# Patient Record
Sex: MALE | Race: BLACK OR AFRICAN AMERICAN | NOT HISPANIC OR LATINO | Employment: FULL TIME | URBAN - METROPOLITAN AREA
[De-identification: names, ages, dates, MRNs, and addresses within clinical notes are randomized per-mention and may not be internally consistent; named-entity substitution may affect disease eponyms.]

---

## 2017-12-11 ENCOUNTER — HOSPITAL ENCOUNTER (EMERGENCY)
Facility: MEDICAL CENTER | Age: 32
End: 2017-12-11
Attending: EMERGENCY MEDICINE

## 2017-12-11 VITALS
WEIGHT: 186.29 LBS | RESPIRATION RATE: 16 BRPM | BODY MASS INDEX: 26.08 KG/M2 | TEMPERATURE: 97.8 F | SYSTOLIC BLOOD PRESSURE: 130 MMHG | DIASTOLIC BLOOD PRESSURE: 80 MMHG | OXYGEN SATURATION: 97 % | HEIGHT: 71 IN | HEART RATE: 65 BPM

## 2017-12-11 DIAGNOSIS — N34.2 URETHRITIS: ICD-10-CM

## 2017-12-11 LAB
APPEARANCE UR: CLEAR
BILIRUB UR QL STRIP.AUTO: NEGATIVE
COLOR UR: YELLOW
GLUCOSE UR STRIP.AUTO-MCNC: NEGATIVE MG/DL
KETONES UR STRIP.AUTO-MCNC: NEGATIVE MG/DL
LEUKOCYTE ESTERASE UR QL STRIP.AUTO: NEGATIVE
MICRO URNS: ABNORMAL
NITRITE UR QL STRIP.AUTO: NEGATIVE
PH UR STRIP.AUTO: 8.5 [PH]
PROT UR QL STRIP: NEGATIVE MG/DL
RBC UR QL AUTO: NEGATIVE
SP GR UR STRIP.AUTO: 1.01
UROBILINOGEN UR STRIP.AUTO-MCNC: 0.2 MG/DL

## 2017-12-11 PROCEDURE — 700102 HCHG RX REV CODE 250 W/ 637 OVERRIDE(OP): Performed by: EMERGENCY MEDICINE

## 2017-12-11 PROCEDURE — 87591 N.GONORRHOEAE DNA AMP PROB: CPT

## 2017-12-11 PROCEDURE — 87491 CHLMYD TRACH DNA AMP PROBE: CPT

## 2017-12-11 PROCEDURE — 96372 THER/PROPH/DIAG INJ SC/IM: CPT

## 2017-12-11 PROCEDURE — 81003 URINALYSIS AUTO W/O SCOPE: CPT

## 2017-12-11 PROCEDURE — 700101 HCHG RX REV CODE 250: Performed by: EMERGENCY MEDICINE

## 2017-12-11 PROCEDURE — A9270 NON-COVERED ITEM OR SERVICE: HCPCS | Performed by: EMERGENCY MEDICINE

## 2017-12-11 PROCEDURE — 700111 HCHG RX REV CODE 636 W/ 250 OVERRIDE (IP): Performed by: EMERGENCY MEDICINE

## 2017-12-11 PROCEDURE — 99284 EMERGENCY DEPT VISIT MOD MDM: CPT

## 2017-12-11 RX ORDER — LIDOCAINE HYDROCHLORIDE 20 MG/ML
20 INJECTION, SOLUTION INFILTRATION; PERINEURAL ONCE
Status: COMPLETED | OUTPATIENT
Start: 2017-12-11 | End: 2017-12-11

## 2017-12-11 RX ORDER — CEFTRIAXONE SODIUM 250 MG/1
250 INJECTION, POWDER, FOR SOLUTION INTRAMUSCULAR; INTRAVENOUS ONCE
Status: COMPLETED | OUTPATIENT
Start: 2017-12-11 | End: 2017-12-11

## 2017-12-11 RX ORDER — AZITHROMYCIN 250 MG/1
1000 TABLET, FILM COATED ORAL ONCE
Status: COMPLETED | OUTPATIENT
Start: 2017-12-11 | End: 2017-12-11

## 2017-12-11 RX ADMIN — AZITHROMYCIN 1000 MG: 250 TABLET, FILM COATED ORAL at 10:58

## 2017-12-11 RX ADMIN — CEFTRIAXONE SODIUM 250 MG: 250 INJECTION, POWDER, FOR SOLUTION INTRAMUSCULAR; INTRAVENOUS at 11:17

## 2017-12-11 RX ADMIN — LIDOCAINE HYDROCHLORIDE 1.2 ML: 20 INJECTION, SOLUTION INFILTRATION; PERINEURAL at 11:17

## 2017-12-11 ASSESSMENT — PAIN SCALES - GENERAL: PAINLEVEL_OUTOF10: 0

## 2017-12-11 NOTE — ED NOTES
Reviewed all discharge instructions with patient, discussed f/u with PCP, pt provided [facility] phone number, Pt denies questions. Pt escorted to lobby.

## 2017-12-11 NOTE — DISCHARGE INSTRUCTIONS
Urethritis, Adult  Urethritis is an inflammation of the tube through which urine exits your bladder (urethra).   CAUSES  Urethritis is often caused by an infection in your urethra. The infection can be viral, like herpes. The infection can also be bacterial, like gonorrhea.  RISK FACTORS  Risk factors of urethritis include:  · Having sex without using a condom.  · Having multiple sexual partners.  · Having poor hygiene.  SIGNS AND SYMPTOMS  Symptoms of urethritis are less noticeable in women than in men. These symptoms include:  · Burning feeling when you urinate (dysuria).  · Discharge from your urethra.  · Blood in your urine (hematuria).  · Urinating more than usual.  DIAGNOSIS   To confirm a diagnosis of urethritis, your health care provider will do the following:  · Ask about your sexual history.  · Perform a physical exam.  · Have you provide a sample of your urine for lab testing.  · Use a cotton swab to gently collect a sample from your urethra for lab testing.  TREATMENT   It is important to treat urethritis. Depending on the cause, untreated urethritis may lead to serious genital infections and possibly infertility. Urethritis caused by a bacterial infection is treated with antibiotic medicine. All sexual partners must be treated.   HOME CARE INSTRUCTIONS  · Do not have sex until the test results are known and treatment is completed, even if your symptoms go away before you finish treatment.  · If you were prescribed an antibiotic, finish it all even if you start to feel better.  SEEK MEDICAL CARE IF:   · Your symptoms are not improved in 3 days.  · Your symptoms are getting worse.  · You develop abdominal pain or pelvic pain (in women).  · You develop joint pain.  · You have a fever.  SEEK IMMEDIATE MEDICAL CARE IF:   · You have severe pain in the belly, back, or side.  · You have repeated vomiting.  MAKE SURE YOU:  · Understand these instructions.  · Will watch your condition.  · Will get help right away  if you are not doing well or get worse.     This information is not intended to replace advice given to you by your health care provider. Make sure you discuss any questions you have with your health care provider.     Document Released: 06/13/2002 Document Revised: 05/03/2016 Document Reviewed: 08/18/2014  Elsevier Interactive Patient Education ©2016 [employer]

## 2017-12-11 NOTE — ED NOTES
"Pt ambulates to triage with c/c bloody stools -\"forever- I fill the toilet with blood when I have a bowel movement.\"  Pt also requesting STD check.  A&ox4. Pt to lobby & advised to inform RN of any changes.    "

## 2017-12-11 NOTE — ED NOTES
"Pt ambulatory to [facility]. Reluctant to change into gown, states \" I know what I need, I need antibiotics for my STD\". Process explained to pt.  Awaiting ERP eval.  "

## 2017-12-11 NOTE — ED PROVIDER NOTES
"ED Provider Note    CHIEF COMPLAINT  Chief Complaint   Patient presents with   • Exposure to STD     requesting STD check   • Bloody Stools     \"the water in the toilet turns red when I have a bowel movement.\"        HPI  Ganga Castro is a 32 y.o. male who presents For evaluation of reported dysuria or urethral discharge. The patient reports a new sexual partner without protection. Over the past week he reports urethral discharge. An incidental and unrelated complaint is that he's had occasional blood intermixed in his stool. He reports that this is been going on for years. He denies any high fevers or chills currently no abdominal pain. No genital lesions no testicular pain or swelling. He has no other complaints at this time    REVIEW OF SYSTEMS  See HPI for further details. No high fevers chills night sweats weight loss numbness tingling or weakness All other systems are negative.     PAST MEDICAL HISTORY  Past Medical History:   Diagnosis Date   • Gastric ulcer        FAMILY HISTORY  No history of bleeding disorder    SOCIAL HISTORY  Social History     Social History   • Marital status: Single     Spouse name: N/A   • Number of children: N/A   • Years of education: N/A     Social History Main Topics   • Smoking status: Not on file   • Smokeless tobacco: Not on file   • Alcohol use Not on file   • Drug use: Unknown   • Sexual activity: Not on file     Other Topics Concern   • Not on file     Social History Narrative   • No narrative on file   Sexually active denies IV drugs     SURGICAL HISTORY  No past surgical history on file.  None reported  CURRENT MEDICATIONS  No regular meds    ALLERGIES  No Known Allergies    PHYSICAL EXAM  VITAL SIGNS: /86   Pulse 79   Temp 36.6 °C (97.8 °F)   Resp 14   Ht 1.803 m (5' 11\")   Wt 84.5 kg (186 lb 4.6 oz)   SpO2 97%   BMI 25.98 kg/m²  Room air O2: 97    Constitutional: Well developed, Well nourished, No acute distress, Non-toxic appearance.   HENT: " Normocephalic, Atraumatic, Bilateral external ears normal, Oropharynx moist, No oral exudates, Nose normal.   Eyes: PERRLA, EOMI, Conjunctiva normal, No discharge.   Neck: Normal range of motion, No tenderness, Supple, No stridor.   Cardiovascular: Normal heart rate, Normal rhythm, No murmurs, No rubs, No gallops.   Thorax & Lungs: Normal breath sounds, No respiratory distress, No wheezing, No chest tenderness.   Abdomen: Bowel sounds normal, Soft, No tenderness, No masses, No pulsatile masses.   Skin: Warm, Dry, No erythema, No rash.   Back: No tenderness, No CVA tenderness.   Genitalia: External genitalia appear normal there does appear to be some subtle urethral discharge no genital lesions no scrotal masses  Extremities: Intact distal pulses, No edema, No tenderness, No cyanosis, No clubbing.    Neurologic: Alert & oriented x 3, Normal motor function, Normal sensory function, No focal deficits noted.   Psychiatric: Patient is very anxious   Results for orders placed or performed during the hospital encounter of 12/11/17   URINALYSIS   Result Value Ref Range    Color Yellow     Character Clear     Specific Gravity 1.014 <1.035    Ph 8.5 (A) 5.0 - 8.0    Glucose Negative Negative mg/dL    Ketones Negative Negative mg/dL    Protein Negative Negative mg/dL    Bilirubin Negative Negative    Urobilinogen, Urine 0.2 Negative    Nitrite Negative Negative    Leukocyte Esterase Negative Negative    Occult Blood Negative Negative    Micro Urine Req see below    CHLAMYDIA/GC PCR URINE OR SWAB   Result Value Ref Range    Source urine       COURSE & MEDICAL DECISION MAKING  Pertinent Labs & Imaging studies reviewed. (See chart for details)  Patient has declined any further workup such as rectal examination blood testing. His vital signs and belly exam are ormal. He simply wants treatment and testing for STDs. He did not understand why an intramuscular shot was required. I explained to him the  recommendations on treatment  of infected urethritis. He did provide a urine sample as he would not comply with a male genital swab test. He was given intramuscular Rocephin and 1000 mg of azithromycin. I counseled him on safe sex practices    FINAL IMPRESSION  1. Infected urethritis      Electronically signed by: Roland Magdaleno, 12/11/2017 10:22 AM

## 2017-12-12 LAB
C TRACH DNA SPEC QL NAA+PROBE: NEGATIVE
N GONORRHOEA DNA SPEC QL NAA+PROBE: NEGATIVE
SPECIMEN SOURCE: NORMAL

## 2018-04-24 ENCOUNTER — HOSPITAL ENCOUNTER (EMERGENCY)
Facility: MEDICAL CENTER | Age: 33
End: 2018-04-24
Attending: EMERGENCY MEDICINE
Payer: COMMERCIAL

## 2018-04-24 VITALS
BODY MASS INDEX: 26.7 KG/M2 | HEART RATE: 75 BPM | TEMPERATURE: 97.6 F | OXYGEN SATURATION: 99 % | WEIGHT: 190.7 LBS | HEIGHT: 71 IN | RESPIRATION RATE: 18 BRPM | SYSTOLIC BLOOD PRESSURE: 135 MMHG | DIASTOLIC BLOOD PRESSURE: 82 MMHG

## 2018-04-24 DIAGNOSIS — S29.012A UPPER BACK STRAIN, INITIAL ENCOUNTER: ICD-10-CM

## 2018-04-24 PROCEDURE — 99282 EMERGENCY DEPT VISIT SF MDM: CPT

## 2018-04-24 ASSESSMENT — PAIN SCALES - GENERAL
PAINLEVEL_OUTOF10: 7
PAINLEVEL_OUTOF10: 7

## 2018-04-24 ASSESSMENT — LIFESTYLE VARIABLES: DO YOU DRINK ALCOHOL: NO

## 2018-04-24 NOTE — ED NOTES
Verbalizes understanding of discharge and followup instructions. PIV removed, VSS. Given worknote. Ambulates with steady gait to discharge.

## 2018-04-24 NOTE — ED TRIAGE NOTES
"Ganga Castro  Chief Complaint   Patient presents with   • Back Pain     L side. Pt states pain started at work yesterday and pain has increased in severity throughout the night. Pt states pain made sleep difficult.       Pt ambulatory to triage with above complaint. Pt unsure if a movement at work caused pain, put states he is unaware of doing anything specific to cause the pain.    /82   Pulse 75   Temp 36.4 °C (97.6 °F)   Resp 18   Ht 1.803 m (5' 11\")   Wt 86.5 kg (190 lb 11.2 oz)   SpO2 99%   BMI 26.60 kg/m²     Pt informed of triage process and encouraged to notify staff of any changes or concerns. Pt verbalized understanding of instructions. Apologized for long wait time. Pt placed back in lobby.     "

## 2018-04-24 NOTE — ED PROVIDER NOTES
"ED Provider Note      CHIEF COMPLAINT  Chief Complaint   Patient presents with   • Back Pain     L side. Pt states pain started at work yesterday and pain has increased in severity throughout the night. Pt states pain made sleep difficult.       JUDD Castro is a 32 y.o. male who presents with left-sided posterior thoracic pain. Started while at work yesterday. He was using a weed whacker with his right arm straightened his left arm bent. Healing slightly to the right when he does this. He noticed some pain just underneath his left scapula. It is sharp and worse when he bends to the right or the twists. No pleuritic pain, cough, hemoptysis, leg swelling, leg pain, surgery, immobilization. He has not had dysuria hematuria or frequency.        REVIEW OF SYSTEMS  Denies any weakness in the lower extremities. No bowel or bladder incontinence or saddle anesthesia. No fevers or chills. No injection drug use or IV drug abuse. No abdominal pain, nausea or vomiting. No dysuria, hematuria or flank pain.    PAST MEDICAL HISTORY  Past Medical History:   Diagnosis Date   • Gastric ulcer        FAMILY HISTORY  History reviewed. No pertinent family history.    SOCIAL HISTORY  Social History   Substance Use Topics   • Smoking status: Current Every Day Smoker   • Smokeless tobacco: Never Used   • Alcohol use No       SURGICAL HISTORY  History reviewed. No pertinent surgical history.    CURRENT MEDICATIONS  Home Medications     Reviewed by Eh Johnston R.N. (Registered Nurse) on 04/24/18 at 0709  Med List Status: Complete   Medication Last Dose Status        Patient Speedy Taking any Medications                       ALLERGIES  No Known Allergies      PHYSICAL EXAM  VITAL SIGNS: /82   Pulse 75   Temp 36.4 °C (97.6 °F)   Resp 18   Ht 1.803 m (5' 11\")   Wt 86.5 kg (190 lb 11.2 oz)   SpO2 99%   BMI 26.60 kg/m²   Constitutional: No acute distress  Neck: Grossly normal range of motion  Cardiovascular: Normal heart " rate   Thorax & Lungs: No respiratory distress  Abdomen: Bowel sounds normal, soft, non-distended, nontender, no masses.  Skin: Warm, Dry, No rash.   Back: There is mild tenderness to palpation just below the left scapula. Movement to the right reproduces symptoms  Extremities: No clubbing, cyanosis, edema, no Homans or cords   Neurologic: Symmetric motor and sensory throughout.    COURSE & MEDICAL DECISION MAKING    Patient presents with history and physical consistent with a thoracic muscular strain. No red flags. I've advised NSAIDs as needed. Advised rest. No lifting for one week. He requested to go back to work tomorrow. I advised him that if he continues with same activities he will have persistent discomfort. He voiced understanding. Will return for fever, urinary symptoms, difficulty breathing or concern. Refer to the [facility] occupational health clinic    Will need to follow up with primary provider for blood pressure recheck. Given number for [facility].    FINAL IMPRESSION  1. Thoracic muscular strain        This dictation was created using voice recognition software. The accuracy of the dictation is limited to the abilities of the software.  The nursing notes were reviewed and certain aspects of this information were incorporated into this note.    Electronically signed by: Ash Burgos, 4/24/2018 7:33 AM

## 2018-04-24 NOTE — ED TRIAGE NOTES
Ambulatory to room, agree with triage note. Right mid back pain, worse with movement. Denies specific injury or strain. Chart up for ERP.

## 2018-04-24 NOTE — DISCHARGE INSTRUCTIONS
Mid-Back Strain  A strain is a stretch or tear in a muscle or the strong cords of tissue that attach muscle to bone (tendons). Strains of the mid-back (thoracic spine) occur between the neck and the lower back. A strain occurs when muscles or tendons are torn or are stretched beyond their limits. The muscles may become inflamed, resulting in pain and sudden muscle tightening (spasms). A strain can happen suddenly due to an injury (trauma), or it can develop gradually due to overuse.  There are three types of strains:  · Grade 1 is a mild strain involving a minor tear of the muscle fibers or tendons. This may cause some pain but no loss of muscle strength.  · Grade 2 is a moderate strain involving a partial tear of the muscle fibers or tendons. This causes more severe pain and some loss of muscle strength.  · Grade 3 is a severe strain involving a complete tear of the muscle or tendon. This causes severe pain and complete or nearly complete loss of muscle strength.  What are the causes?  This condition may be caused by:  · Trauma, such as a fall or a hit to the body.  · Twisting or overstretching the back. This may result from doing activities that require a lot of energy, such as lifting heavy objects.  What increases the risk?  The following factors may increase your risk of getting this condition:  · Playing contact sports.  · Playing sports that put stress on the middle back, including:  ¨ Gymnastics.  ¨ Rowing.  ¨ Golf.  ¨ Horseback riding.  What are the signs or symptoms?  Symptoms of this condition may include:  · Sharp, dull, or spreading pain in the middle back that does not go away.  · Stiffness.  · Limited range of motion.  · Inability to stand up straight due to stiffness or pain.  · Muscle spasms.  How is this diagnosed?     This condition may be diagnosed based on:  · Your symptoms.  · Your medical history.  · A physical exam.  ¨ Your health care provider may push on certain areas of your back to  determine the source of your pain.  ¨ You may be asked to bend forward, backward, and side to side to assess the severity of your pain and your range of motion.  · Imaging tests, such as:  ¨ X-rays.  ¨ MRI.  How is this treated?  Treatment for this condition may include:  · Applying heat and cold to the affected area.  · Medicines to help relieve pain and to relax your muscles (muscle relaxants).  · NSAIDs to help reduce swelling and discomfort.  · Physical therapy.  When your symptoms improve, it is important to gradually return to your normal routine as soon as possible to reduce pain, avoid stiffness, and avoid loss of muscle strength. Generally, symptoms should improve within 6 weeks of treatment. However, recovery time varies.  Follow these instructions at home:  Managing pain, stiffness, and swelling  · If directed, apply ice to the injured area during the first 24 hours after your injury.  ¨ Put ice in a plastic bag.  ¨ Place a towel between your skin and the bag.  ¨ Leave the ice on for 20 minutes, 2-3 times a day.  · If directed, apply heat to the affected area as often as told by your health care provider. Use the heat source that your health care provider recommends, such as a moist heat pack or a heating pad.  ¨ Place a towel between your skin and the heat source.  ¨ Leave the heat on for 20-30 minutes.  ¨ Remove the heat if your skin turns bright red. This is especially important if you are unable to feel pain, heat, or cold. You may have a greater risk of getting burned.  Activity  · Rest and return to your normal activities as told by your health care provider. Ask your health care provider what activities are safe for you.  · Avoid activities that take a lot of effort (are strenuous) for as long as told by your health care provider.  · Do exercises as told by your health care provider.  General instructions  · Take over-the-counter and prescription medicines only as told by your health care  provider.  · If you have questions or concerns about safety while taking pain medicine, talk with your health care provider.  · Do not drive or operate heavy machinery until you know how your pain medicine affects you.  · Do not use any tobacco products, such as cigarettes, chewing tobacco, and e-cigarettes. Tobacco can delay bone healing. If you need help quitting, ask your health care provider.  · Keep all follow-up visits as told by your health care provider. This is important.  How is this prevented?  · Warm up and stretch before being active.  · Cool down and stretch after being active.  · Give your body time to rest between periods of activity.  · Avoid:  ¨ Being physically inactive for long periods at a time.  ¨ Exercising or playing sports when you are tired or in pain.  · Use correct form when playing sports and lifting heavy objects.  · Use good posture when sitting and standing.  · Maintain a healthy weight.  · Sleep on a mattress with medium firmness to support your back.  · Make sure to use equipment that fits you, including shoes that fit well.  · Be safe and responsible while being active to avoid falls.  · Do at least 150 minutes of moderate-intensity exercise each week, such as brisk walking or water aerobics. Try a form of exercise that takes stress off your back, such as swimming or stationary cycling.  · Maintain physical fitness, including:  ¨ Strength.  ¨ Flexibility.  ¨ Cardiovascular fitness.  ¨ Endurance.  Contact a health care provider if:  · Your back pain does not improve after 6 weeks of treatment.  · Your symptoms get worse.  Get help right away if:  · Your back pain is severe.  · You are unable to stand or walk.  · You develop pain in your legs.  · You develop weakness in your buttocks or legs.  · You have difficulty controlling when you urinate or when you have a bowel movement.  This information is not intended to replace advice given to you by your health care provider. Make sure  you discuss any questions you have with your health care provider.  Document Released: 12/18/2006 Document Revised: 08/24/2017 Document Reviewed: 09/28/2016  Elsevier Interactive Patient Education © 2017 [employer]

## 2019-12-05 ENCOUNTER — HOSPITAL ENCOUNTER (EMERGENCY)
Dept: HOSPITAL 8 - ED | Age: 34
Discharge: HOME | End: 2019-12-05
Payer: SELF-PAY

## 2019-12-05 VITALS — WEIGHT: 196.21 LBS | HEIGHT: 71 IN | BODY MASS INDEX: 27.47 KG/M2

## 2019-12-05 VITALS — SYSTOLIC BLOOD PRESSURE: 139 MMHG | DIASTOLIC BLOOD PRESSURE: 76 MMHG

## 2019-12-05 DIAGNOSIS — N34.1: Primary | ICD-10-CM

## 2019-12-05 LAB
CULTURE INDICATED?: YES
MICROSCOPIC: (no result)

## 2019-12-05 PROCEDURE — 87591 N.GONORRHOEAE DNA AMP PROB: CPT

## 2019-12-05 PROCEDURE — 96372 THER/PROPH/DIAG INJ SC/IM: CPT

## 2019-12-05 PROCEDURE — 87491 CHLMYD TRACH DNA AMP PROBE: CPT

## 2019-12-05 PROCEDURE — 87086 URINE CULTURE/COLONY COUNT: CPT

## 2019-12-05 PROCEDURE — 99283 EMERGENCY DEPT VISIT LOW MDM: CPT

## 2019-12-05 PROCEDURE — 87077 CULTURE AEROBIC IDENTIFY: CPT

## 2019-12-05 PROCEDURE — 81001 URINALYSIS AUTO W/SCOPE: CPT

## 2019-12-05 NOTE — NUR
Triage RN: pt denies any dizziness & states that he did not want to tell 
Registration what his actual cheif complaint of STI check is.

## 2019-12-05 NOTE — NUR
PT REPORTS PAINFUL URINATION SINCE YESTERDAY. STATES HE HAS STD. + DRAINAGE 
FROM [place]. REPORTS RECENT UNPROTECTED SEX. SITTING ON JANINE MONTANO AT 
BEDSIDE TO ASSESS PT NOW.

## 2020-02-07 ENCOUNTER — HOSPITAL ENCOUNTER (EMERGENCY)
Facility: MEDICAL CENTER | Age: 35
End: 2020-02-07
Attending: EMERGENCY MEDICINE

## 2020-02-07 VITALS
RESPIRATION RATE: 16 BRPM | SYSTOLIC BLOOD PRESSURE: 136 MMHG | BODY MASS INDEX: 27.78 KG/M2 | DIASTOLIC BLOOD PRESSURE: 85 MMHG | OXYGEN SATURATION: 96 % | TEMPERATURE: 98.1 F | HEART RATE: 62 BPM | WEIGHT: 198.41 LBS | HEIGHT: 71 IN

## 2020-02-07 DIAGNOSIS — R30.0 DYSURIA: ICD-10-CM

## 2020-02-07 LAB
APPEARANCE UR: CLEAR
BILIRUB UR QL STRIP.AUTO: NEGATIVE
C TRACH DNA SPEC QL NAA+PROBE: NEGATIVE
COLOR UR: YELLOW
GLUCOSE UR STRIP.AUTO-MCNC: NEGATIVE MG/DL
KETONES UR STRIP.AUTO-MCNC: NEGATIVE MG/DL
LEUKOCYTE ESTERASE UR QL STRIP.AUTO: NEGATIVE
MICRO URNS: NORMAL
N GONORRHOEA DNA SPEC QL NAA+PROBE: NEGATIVE
NITRITE UR QL STRIP.AUTO: NEGATIVE
PH UR STRIP.AUTO: 7 [PH] (ref 5–8)
PROT UR QL STRIP: NEGATIVE MG/DL
RBC UR QL AUTO: NEGATIVE
SP GR UR STRIP.AUTO: 1.02
SPECIMEN SOURCE: NORMAL
UROBILINOGEN UR STRIP.AUTO-MCNC: 0.2 MG/DL

## 2020-02-07 PROCEDURE — 99284 EMERGENCY DEPT VISIT MOD MDM: CPT

## 2020-02-07 PROCEDURE — A9270 NON-COVERED ITEM OR SERVICE: HCPCS | Performed by: EMERGENCY MEDICINE

## 2020-02-07 PROCEDURE — 87491 CHLMYD TRACH DNA AMP PROBE: CPT

## 2020-02-07 PROCEDURE — 87591 N.GONORRHOEAE DNA AMP PROB: CPT

## 2020-02-07 PROCEDURE — 700102 HCHG RX REV CODE 250 W/ 637 OVERRIDE(OP): Performed by: EMERGENCY MEDICINE

## 2020-02-07 PROCEDURE — 81003 URINALYSIS AUTO W/O SCOPE: CPT

## 2020-02-07 RX ORDER — PHENAZOPYRIDINE HYDROCHLORIDE 200 MG/1
200 TABLET, FILM COATED ORAL 3 TIMES DAILY PRN
Qty: 6 TAB | Refills: 0 | Status: SHIPPED | OUTPATIENT
Start: 2020-02-07 | End: 2020-02-09

## 2020-02-07 RX ORDER — PHENAZOPYRIDINE HYDROCHLORIDE 200 MG/1
200 TABLET, FILM COATED ORAL ONCE
Status: COMPLETED | OUTPATIENT
Start: 2020-02-07 | End: 2020-02-07

## 2020-02-07 RX ADMIN — PHENAZOPYRIDINE 200 MG: 200 TABLET ORAL at 11:34

## 2020-02-07 NOTE — ED NOTES
Patient discharged. Patient provided education on dysuria and to follow-up with the lab for results. Patient educated to come back to the hospital for worsening symptoms. Patient provided script and walked to lobby

## 2020-02-07 NOTE — ED PROVIDER NOTES
ED Provider Note    Scribed for Ken Spencer D.O. by Renee White. 2020  10:05 AM    Primary care provider: Pcp Pt States None  Means of arrival: Walk in  History obtained from: Patient  History limited by: None    CHIEF COMPLAINT  Chief Complaint   Patient presents with   • Painful Urination     started yesterday, pt had some RUQ pain yesterday none today.; pt reports that he was tested for STD's and was negative, no treatment was given at that time.         JUDD Castro is a 34 y.o. male, cir who presents to the Emergency Department for ongoing dysuria that began yesterday night. Patient describes that he feels a burning and sharp quality pain during urination with radiating pain to his right upper quadrant yesterday that has now resolved. He denies similar complaints in the past, and denies history of UTI or diabetes. Patient states that he is sexually active with one female partner. He and his partner were tested negative for STDs 3 months ago. He reports associated urinary frequency. He denies any associated testicular discharge, testicular pain, vaginal discharge in his partner, nausea, vomiting, or flank pain. He notes that his dysuria is worse in the morning.    REVIEW OF SYSTEMS  Pertinent positives include dysuria, urinary frequency. Pertinent negatives include no testicular discharge, testicular pain, nausea, vomiting, back pain, right upper quadrant abdominal pain (resolved).      PAST MEDICAL HISTORY  Past Medical History:   Diagnosis Date   • Gastric ulcer        SURGICAL HISTORY  History reviewed. No pertinent surgical history.     SOCIAL HISTORY  Social History     Tobacco Use   • Smoking status: Former Smoker     Packs/day: 0.00     Last attempt to quit: 2020     Years since quittin.0   • Smokeless tobacco: Never Used   Substance Use Topics   • Alcohol use: No   • Drug use: Yes     [facility]     [facility]      Social History     Substance and Sexual Activity  "  Drug Use Yes   • Types: Inhaled    Comment: [facility]lette       FAMILY HISTORY  History reviewed. No pertinent family history.    CURRENT MEDICATIONS  Home Medications     Reviewed by Ela Nayak R.N. (Registered Nurse) on 02/07/20 at 0931  Med List Status: Complete   Medication Last Dose Status        Patient Speedy Taking any Medications                       ALLERGIES  No Known Allergies    PHYSICAL EXAM  VITAL SIGNS: /80   Pulse 68   Temp 36.7 °C (98.1 °F) (Oral)   Resp 14   Ht 1.803 m (5' 11\")   Wt 90 kg (198 lb 6.6 oz)   SpO2 98%   BMI 27.67 kg/m²     Nursing notes and vitals reviewed.  Constitutional: Well developed, Well nourished, No acute distress, Non-toxic appearance.   Eyes: PERRLA, EOMI, Conjunctiva normal, No discharge.     Abdomen: Bowel sounds normal, Soft, No tenderness, No guarding, No rebound, No masses, No pulsatile masses.   Skin: Warm, Dry, No erythema, No rash.   : Circumcised male, no penile drip, no penile erythema or edema, no scrotal edema, no testicular tenderness.      DIAGNOSTIC STUDIES/PROCEDURES    LABS  Results for orders placed or performed during the hospital encounter of 02/07/20   URINALYSIS,CULTURE IF INDICATED   Result Value Ref Range    Color Yellow     Character Clear     Specific Gravity 1.021 <1.035    Ph 7.0 5.0 - 8.0    Glucose Negative Negative mg/dL    Ketones Negative Negative mg/dL    Protein Negative Negative mg/dL    Bilirubin Negative Negative    Urobilinogen, Urine 0.2 Negative    Nitrite Negative Negative    Leukocyte Esterase Negative Negative    Occult Blood Negative Negative    Micro Urine Req see below    Chlamydia/GC PCR Urine Or Swab   Result Value Ref Range    Source Urine      All labs reviewed by me.    COURSE & MEDICAL DECISION MAKING  Pertinent Labs & Imaging studies reviewed. (See chart for details)    10:05 AM - Patient seen and examined at bedside. Discussed plan of care with patient. I informed them that labs will be ordered to " evaluate symptoms. Patient is understanding and agreeable with plan. Ordered UA, Chlamydia/GC to evaluate his symptoms.     11:20 AM - Patient was reevaluated at bedside. He is resting comfortably in bed with stable vital signs and no new complaints at this time. Discussed lab results with the patient and informed them that his UA was negative. He is safe to be discharged home at this time, and will follow up with his PCP. Patient understands and agrees with discharge home.   Vitals:    02/07/20 0920   BP: 138/80   Pulse: 68   Resp: 14   Temp: 36.7 °C (98.1 °F)   SpO2: 98%       The patient will return for new or worsening symptoms and is stable at the time of discharge.    This is a charming 34 y.o. male that presents with dysuria, burning sensation.  Urinalysis is negative for infection.  Have high suspicion for gonorrhea chlamydia.  The patient adamantly refused me giving him antibiotics for presumed infection.  For this reason, I have given him Pyridium for his discomfort and stated that he will need to follow-up with his lab results as his gonorrhea chlamydia are pending at this point in time.  If he is positive, is instructed to follow-up with public health for syphilis and HIV testing and to contact his partner.  Patient understands the need to follow-up with his labs.        DISPOSITION:  Patient will be discharged home in stable condition.    The patient is referred to a primary physician for blood pressure management, diabetic screening, and for all other preventative health concerns.    FOLLOW UP:  [facility] Emergency Dept  [address] [facility] [address] [phone]    If symptoms worsen      OUTPATIENT MEDICATIONS:  New Prescriptions    PHENAZOPYRIDINE (PYRIDIUM) 200 MG TAB    Take 1 Tab by mouth 3 times a day as needed (with food) for up to 2 days.         FINAL IMPRESSION  1. Dysuria          I, Renee White (Scribe), am scribing for, and in the presence of, Ken  JOHN Spencer D.O    Electronically signed by: Renee White (Scribe), 2/7/2020    I, Ken Spencer D.O. personally performed the services described in this documentation, as scribed by Renee White in my presence, and it is both accurate and complete.    C    The note accurately reflects work and decisions made by me.  Ken Spencer D.O.  2/7/2020  2:36 PM

## 2020-02-07 NOTE — ED TRIAGE NOTES
Pt ambulated to triage with   Chief Complaint   Patient presents with   • Painful Urination     started yesterday, pt had some RUQ pain yesterday none today.; pt reports that he was tested for STD's and was negative, no treatment was given at that time.          Pt Informed regarding triage process and verbalized understanding to inform triage tech or RN for any changes in condition. Placed in lobby.

## 2020-02-07 NOTE — DISCHARGE INSTRUCTIONS
Please call the lab in 2 days for a results from your urine gonorrhea and chlamydia test.  If positive, we will be prescribing you medications and you will need to contact your partner concerning your infection.  In addition you will need to follow-up with [facility] health for syphilis and HIV testing.  If negative, you will not hear from us but is important that you check the results on your own.

## 2021-06-24 ENCOUNTER — OFFICE VISIT (OUTPATIENT)
Dept: URGENT CARE | Facility: PHYSICIAN GROUP | Age: 36
End: 2021-06-24

## 2021-06-24 ENCOUNTER — NON-PROVIDER VISIT (OUTPATIENT)
Dept: URGENT CARE | Facility: PHYSICIAN GROUP | Age: 36
End: 2021-06-24

## 2021-06-24 VITALS
TEMPERATURE: 97.8 F | OXYGEN SATURATION: 99 % | SYSTOLIC BLOOD PRESSURE: 122 MMHG | WEIGHT: 212 LBS | DIASTOLIC BLOOD PRESSURE: 82 MMHG | RESPIRATION RATE: 18 BRPM | BODY MASS INDEX: 29.68 KG/M2 | HEART RATE: 90 BPM | HEIGHT: 71 IN

## 2021-06-24 DIAGNOSIS — Z02.4 ENCOUNTER FOR DEPARTMENT OF TRANSPORTATION (DOT) EXAMINATION FOR TRUCKING LICENSE: ICD-10-CM

## 2021-06-24 PROCEDURE — 8907 PR URINE COLLECT ONLY: Performed by: NURSE PRACTITIONER

## 2021-06-24 PROCEDURE — 7100 PR DOT PHYSICAL: Performed by: NURSE PRACTITIONER

## 2021-06-24 NOTE — PROGRESS NOTES
"Subjective:      Ganga Castro is a 35 y.o. male who presents with Other (dot physical)            HPI    ROS    DOT physical   Objective:     /82 (BP Location: Left arm, Patient Position: Sitting, BP Cuff Size: Adult)   Pulse 90   Temp 36.6 °C (97.8 °F) (Temporal)   Resp 18   Ht 1.803 m (5' 11\")   Wt 96.2 kg (212 lb)   SpO2 99%   BMI 29.57 kg/m²      Physical Exam              DOT- see scanned documents     Assessment/Plan:        1. 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  .  "

## 2022-09-29 ENCOUNTER — HOSPITAL ENCOUNTER (EMERGENCY)
Facility: MEDICAL CENTER | Age: 37
End: 2022-09-30
Attending: EMERGENCY MEDICINE
Payer: COMMERCIAL

## 2022-09-29 DIAGNOSIS — M62.838 MUSCLE SPASMS OF NECK: ICD-10-CM

## 2022-09-29 PROCEDURE — 93005 ELECTROCARDIOGRAM TRACING: CPT | Performed by: EMERGENCY MEDICINE

## 2022-09-29 PROCEDURE — 93005 ELECTROCARDIOGRAM TRACING: CPT

## 2022-09-29 PROCEDURE — 99283 EMERGENCY DEPT VISIT LOW MDM: CPT

## 2022-09-30 VITALS
HEIGHT: 71 IN | SYSTOLIC BLOOD PRESSURE: 149 MMHG | TEMPERATURE: 97.7 F | DIASTOLIC BLOOD PRESSURE: 101 MMHG | WEIGHT: 187.39 LBS | BODY MASS INDEX: 26.23 KG/M2 | RESPIRATION RATE: 16 BRPM | HEART RATE: 72 BPM | OXYGEN SATURATION: 98 %

## 2022-09-30 LAB — EKG IMPRESSION: NORMAL

## 2022-09-30 PROCEDURE — A9270 NON-COVERED ITEM OR SERVICE: HCPCS | Performed by: EMERGENCY MEDICINE

## 2022-09-30 PROCEDURE — 700102 HCHG RX REV CODE 250 W/ 637 OVERRIDE(OP): Performed by: EMERGENCY MEDICINE

## 2022-09-30 RX ORDER — IBUPROFEN 800 MG/1
800 TABLET ORAL EVERY 8 HOURS PRN
Qty: 20 TABLET | Refills: 0 | Status: SHIPPED | OUTPATIENT
Start: 2022-09-30 | End: 2022-10-03

## 2022-09-30 RX ORDER — ACETAMINOPHEN 500 MG
1000 TABLET ORAL ONCE
Status: COMPLETED | OUTPATIENT
Start: 2022-09-30 | End: 2022-09-30

## 2022-09-30 RX ORDER — CYCLOBENZAPRINE HCL 10 MG
10 TABLET ORAL 3 TIMES DAILY PRN
Qty: 10 TABLET | Refills: 0 | Status: SHIPPED | OUTPATIENT
Start: 2022-09-30 | End: 2022-10-03

## 2022-09-30 RX ORDER — LIDOCAINE 50 MG/G
1 PATCH TOPICAL EVERY 24 HOURS
Qty: 5 PATCH | Refills: 0 | Status: SHIPPED | OUTPATIENT
Start: 2022-09-30 | End: 2022-10-05

## 2022-09-30 RX ORDER — ACETAMINOPHEN 500 MG
1000 TABLET ORAL EVERY 6 HOURS PRN
Qty: 20 TABLET | Refills: 0 | Status: SHIPPED | OUTPATIENT
Start: 2022-09-30 | End: 2022-10-04

## 2022-09-30 RX ORDER — DIAZEPAM 5 MG/1
5 TABLET ORAL ONCE
Status: COMPLETED | OUTPATIENT
Start: 2022-09-30 | End: 2022-09-30

## 2022-09-30 RX ADMIN — ACETAMINOPHEN 1000 MG: 500 TABLET ORAL at 00:44

## 2022-09-30 RX ADMIN — DIAZEPAM 5 MG: 5 TABLET ORAL at 00:44

## 2022-09-30 RX ADMIN — IBUPROFEN 800 MG: 200 TABLET, FILM COATED ORAL at 00:44

## 2022-09-30 NOTE — DISCHARGE INSTRUCTIONS
Please take at 1000 mg of Tylenol 3 times a day. You may also take 800 mg of Motrin 3 times a day. I have prescribed you lidocaine patches and Flexeril 10 mg 3 times a day for breakthrough pain. I recommend using a foam roller which can be purchased at a sporting good store 3 times a day to help with pain. You may benefit from talking with your PCP about physical therapy, stretching exercises and other pain management strategies for your muscle spasm. Thank you for coming in today and please return if any symptoms worsen. Thank you.

## 2022-09-30 NOTE — ED PROVIDER NOTES
ED Provider Note  ED Provider Note    Scribed for Alex Costa by Alex Costa. 2022  12:33 AM    Primary care provider: Pcp Pt States None  Means of arrival: Private vehicle  History obtained from: Patient  History limited by: None    CHIEF COMPLAINT  Chief Complaint   Patient presents with    Neck Pain     L side of neck and shoulder started hurting around 4 pm today   continued to increase in severity  hurts w/ movement        HPI  Ganga Castro is a 37 y.o. male who presents to the Emergency Department with no past medical history, he is healthy otherwise, presenting with a left-sided neck spasm.  Patient was doing pull-ups earlier today and tonight he was unable to sleep due to pain in the left side of his trapezius and neck.  He denies any nausea, vomiting, trauma, headache, shortness of breath or chest pain.  He denies alcohol, drug or tobacco abuse.  Takes no medications.  Has not tried to treat with anything.  Worse with movement of the neck.  Quality: Aching  Duration: 5 hours  Severity: Moderate  Associated sx: None    REVIEW OF SYSTEMS  As above, all other systems reviewed and are negative.   See HPI for further details.     PAST MEDICAL HISTORY   has a past medical history of Gastric ulcer.  SURGICAL HISTORY  patient denies any surgical history  SOCIAL HISTORY  Social History     Tobacco Use    Smoking status: Former     Packs/day: 0.00     Types: Cigarettes     Quit date: 2020     Years since quittin.7    Smokeless tobacco: Never   Vaping Use    Vaping Use: Never used   Substance Use Topics    Alcohol use: Yes     Comment: rare    Drug use: Yes     [facility]      Social History     Substance and Sexual Activity   Drug Use Yes    Types: Inhaled    Comment: [facility]     FAMILY HISTORY  History reviewed. No pertinent family history.  CURRENT MEDICATIONS  Home Medications       Reviewed by Lorrie Alatorre R.N. (Registered Nurse) on 22 at 2330  " Med List Status: Not Addressed     Medication Last Dose Status        Patient Speedy Taking any Medications                         ALLERGIES  No Known Allergies  PHYSICAL EXAM  VITAL SIGNS:   Vitals:    22 2322 22 2324 22 0036   BP:  (!) 149/101 (!) 149/101   Pulse:  72 72   Resp:  16 16   Temp:  36.5 °C (97.7 °F) 36.5 °C (97.7 °F)   TempSrc:  Temporal Temporal   SpO2:  98% 98%   Weight: 85 kg (187 lb 6.3 oz)     Height: 1.803 m (5' 11\")       Vitals: My interpretation: Hypertension, not tachycardic, afebrile, not hypoxic    Reinterpretation of vitals: Unchanged    PE:   Gen: sitting comfortably, speaking clearly, appears in no acute distress   ENT: Mucous membranes moist, posterior pharynx clear, uvula midline, nares patent bilaterally   Neck: Supple, FROM  Pulmonary: Lungs are clear to auscultation bilaterally. No tachypnea  CV:  RRR, no murmur appreciated, pulses 2+ in both upper and lower extremities  Abdomen: soft, NT/ND; no rebound/guarding  : no CVA or suprapubic tenderness   Neuro: A&Ox4 (person, place, time, situation), speech fluent, gait steady, no focal deficits appreciated  Skin: No rash or lesions.  No pallor or jaundice.  No cyanosis.  Warm and dry.   Neck: There is reproducible tenderness in the trapezius and the left side of the neck paraspinal cervical muscles that when palpated reproduces his pain, mild decreased range of motion but no signs of meningitis, no rash    DIAGNOSTIC STUDIES / PROCEDURES    LABS  Results for orders placed or performed during the hospital encounter of 22   EKG   Result Value Ref Range    Report       [facility] Emergency Dept.    Test Date:  2022  Pt Name:    GENEVIEVE MAHONEY                Department: [facility]  MRN:        [id number]                      Room:       -ROOM 2  Gender:     Male                         Technician: [id number]  :        1985                   Requested By:ER TRIAGE PROTOCOL  Order #:    " [id number]                    Reading MD: Alex Costa    Measurements  Intervals                                Axis  Rate:       68                           P:          61  CA:         156                          QRS:        68  QRSD:       91                           T:          50  QT:         378  QTc:        402    Interpretive Statements  Sinus rhythm  Consider left ventricular hypertrophy  No previous ECG available for comparison  Electronically Signed On 9- 0:34:01 PDT by Alex Costa        All labs reviewed by me. Significant for None    RADIOLOGY  No orders to display     The radiologist's interpretation of all radiological studies have been reviewed by me.    COURSE & MEDICAL DECISION MAKING  Nursing notes, VS, PMSFHx, labs, imaging, EKG reviewed in chart.    MDM: 12:33 AM Ganga Castro is a 37 y.o. male who presented with muscle spasm in the neck on the left in the trapezius after doing pull-ups today.  No prior medical history.  Has not tried to treat with anything.  Reproducible tenderness in the left cervical paraspinal muscles and trapezius when palpated that reproduces the patient symptoms.  He was treated with Valium, Motrin, Tylenol in the ED, Rx for the same but including Flexeril set of Valium.  As well as lidocaine patches.  He will follow-up with PCP, recommend stretching exercises, foam rolling, massage therapy and outpatient follow-up.  Patient verbalized understand strict return precautions and outpatient follow-up plan.  Vital signs normal, physical exam otherwise benign and review of systems otherwise negative.    FINAL IMPRESSION  1. Muscle spasms of neck Acute      The note accurately reflects work and decisions made by me.  Alex Costa  9/30/2022  12:36 AM

## 2022-09-30 NOTE — ED TRIAGE NOTES
"Pt comes in w/ SO  c/o L side neck, shoulder and upper arm pain that started around 4 pm today  unsure what cause is however around 10 am was doing multiple pull up's on gym bar    possible causing this issue per pt   pain has gradually gotten worse t/o the day  took 800 mg Advil w/out effect  hurt to turn head and move shoulder and arm  no cardiac issue  no Hx of such  \"feels like my muscles are stiffing up\"  "

## 2023-01-10 ENCOUNTER — HOSPITAL ENCOUNTER (EMERGENCY)
Facility: MEDICAL CENTER | Age: 38
End: 2023-01-10
Attending: EMERGENCY MEDICINE

## 2023-01-10 ENCOUNTER — APPOINTMENT (OUTPATIENT)
Dept: RADIOLOGY | Facility: MEDICAL CENTER | Age: 38
End: 2023-01-10
Attending: EMERGENCY MEDICINE

## 2023-01-10 VITALS
HEIGHT: 71 IN | RESPIRATION RATE: 16 BRPM | OXYGEN SATURATION: 98 % | BODY MASS INDEX: 27.04 KG/M2 | HEART RATE: 71 BPM | DIASTOLIC BLOOD PRESSURE: 74 MMHG | WEIGHT: 193.12 LBS | TEMPERATURE: 98.8 F | SYSTOLIC BLOOD PRESSURE: 138 MMHG

## 2023-01-10 DIAGNOSIS — R60.0 SUBMANDIBULAR GLAND SWELLING: ICD-10-CM

## 2023-01-10 LAB
ALBUMIN SERPL BCP-MCNC: 4.6 G/DL (ref 3.2–4.9)
ALBUMIN/GLOB SERPL: 1.4 G/DL
ALP SERPL-CCNC: 95 U/L (ref 30–99)
ALT SERPL-CCNC: 17 U/L (ref 2–50)
ANION GAP SERPL CALC-SCNC: 9 MMOL/L (ref 7–16)
AST SERPL-CCNC: 19 U/L (ref 12–45)
BASOPHILS # BLD AUTO: 0.7 % (ref 0–1.8)
BASOPHILS # BLD: 0.04 K/UL (ref 0–0.12)
BILIRUB SERPL-MCNC: 0.2 MG/DL (ref 0.1–1.5)
BUN SERPL-MCNC: 14 MG/DL (ref 8–22)
CALCIUM ALBUM COR SERPL-MCNC: 9 MG/DL (ref 8.5–10.5)
CALCIUM SERPL-MCNC: 9.5 MG/DL (ref 8.4–10.2)
CHLORIDE SERPL-SCNC: 104 MMOL/L (ref 96–112)
CO2 SERPL-SCNC: 27 MMOL/L (ref 20–33)
CREAT SERPL-MCNC: 0.97 MG/DL (ref 0.5–1.4)
EOSINOPHIL # BLD AUTO: 0.07 K/UL (ref 0–0.51)
EOSINOPHIL NFR BLD: 1.2 % (ref 0–6.9)
ERYTHROCYTE [DISTWIDTH] IN BLOOD BY AUTOMATED COUNT: 43.8 FL (ref 35.9–50)
GFR SERPLBLD CREATININE-BSD FMLA CKD-EPI: 103 ML/MIN/1.73 M 2
GLOBULIN SER CALC-MCNC: 3.2 G/DL (ref 1.9–3.5)
GLUCOSE SERPL-MCNC: 92 MG/DL (ref 65–99)
HCT VFR BLD AUTO: 46.7 % (ref 42–52)
HGB BLD-MCNC: 15.7 G/DL (ref 14–18)
IMM GRANULOCYTES # BLD AUTO: 0.01 K/UL (ref 0–0.11)
IMM GRANULOCYTES NFR BLD AUTO: 0.2 % (ref 0–0.9)
LYMPHOCYTES # BLD AUTO: 2.56 K/UL (ref 1–4.8)
LYMPHOCYTES NFR BLD: 45 % (ref 22–41)
MCH RBC QN AUTO: 32.5 PG (ref 27–33)
MCHC RBC AUTO-ENTMCNC: 33.6 G/DL (ref 33.7–35.3)
MCV RBC AUTO: 96.7 FL (ref 81.4–97.8)
MONOCYTES # BLD AUTO: 0.39 K/UL (ref 0–0.85)
MONOCYTES NFR BLD AUTO: 6.9 % (ref 0–13.4)
NEUTROPHILS # BLD AUTO: 2.62 K/UL (ref 1.82–7.42)
NEUTROPHILS NFR BLD: 46 % (ref 44–72)
NRBC # BLD AUTO: 0 K/UL
NRBC BLD-RTO: 0 /100 WBC
PLATELET # BLD AUTO: 262 K/UL (ref 164–446)
PMV BLD AUTO: 9.2 FL (ref 9–12.9)
POTASSIUM SERPL-SCNC: 3.7 MMOL/L (ref 3.6–5.5)
PROT SERPL-MCNC: 7.8 G/DL (ref 6–8.2)
RBC # BLD AUTO: 4.83 M/UL (ref 4.7–6.1)
SODIUM SERPL-SCNC: 140 MMOL/L (ref 135–145)
WBC # BLD AUTO: 5.7 K/UL (ref 4.8–10.8)

## 2023-01-10 PROCEDURE — 80053 COMPREHEN METABOLIC PANEL: CPT

## 2023-01-10 PROCEDURE — 99283 EMERGENCY DEPT VISIT LOW MDM: CPT

## 2023-01-10 PROCEDURE — 700117 HCHG RX CONTRAST REV CODE 255: Performed by: EMERGENCY MEDICINE

## 2023-01-10 PROCEDURE — A9270 NON-COVERED ITEM OR SERVICE: HCPCS | Performed by: EMERGENCY MEDICINE

## 2023-01-10 PROCEDURE — 70491 CT SOFT TISSUE NECK W/DYE: CPT

## 2023-01-10 PROCEDURE — 700102 HCHG RX REV CODE 250 W/ 637 OVERRIDE(OP): Performed by: EMERGENCY MEDICINE

## 2023-01-10 PROCEDURE — 85025 COMPLETE CBC W/AUTO DIFF WBC: CPT

## 2023-01-10 RX ORDER — CEPHALEXIN 250 MG/1
500 CAPSULE ORAL ONCE
Status: COMPLETED | OUTPATIENT
Start: 2023-01-10 | End: 2023-01-10

## 2023-01-10 RX ORDER — CEPHALEXIN 500 MG/1
500 CAPSULE ORAL 4 TIMES DAILY
Qty: 40 CAPSULE | Refills: 0 | Status: SHIPPED | OUTPATIENT
Start: 2023-01-10

## 2023-01-10 RX ADMIN — IOHEXOL 80 ML: 350 INJECTION, SOLUTION INTRAVENOUS at 18:49

## 2023-01-10 RX ADMIN — CEPHALEXIN 500 MG: 250 CAPSULE ORAL at 19:35

## 2023-01-10 ASSESSMENT — LIFESTYLE VARIABLES
TOTAL SCORE: 0
EVER HAD A DRINK FIRST THING IN THE MORNING TO STEADY YOUR NERVES TO GET RID OF A HANGOVER: NO
EVER FELT BAD OR GUILTY ABOUT YOUR DRINKING: NO
HAVE PEOPLE ANNOYED YOU BY CRITICIZING YOUR DRINKING: NO
TOTAL SCORE: 0
CONSUMPTION TOTAL: INCOMPLETE
DO YOU DRINK ALCOHOL: YES
TOTAL SCORE: 0
HAVE YOU EVER FELT YOU SHOULD CUT DOWN ON YOUR DRINKING: NO

## 2023-01-11 NOTE — ED TRIAGE NOTES
"Patient presents with concerns for localized neck swelling and \"feeling weird\" while swallowing. Denies any difficulty breathing or pain. No recent injury or trauma. Denies any allergies or new medications. Only symptom is vague nausea x 1 week  "

## 2023-01-11 NOTE — DISCHARGE INSTRUCTIONS
Your CAT scan revealed that you have swelling and inflammation in your submandibular gland.  This is likely due to infection and therefore I am going to treat you with antibiotics.  If the swelling does not go down after the antibiotics are completed you will need to follow-up with your primary care doctor for further evaluation and possible biopsy.  Apply warm moist compresses as needed  Tylenol or ibuprofen if pain.

## 2023-01-11 NOTE — ED PROVIDER NOTES
"  ER Provider Note    Scribed for Lisa Cheung D.o. by Emily Cm. 1/10/2023  6:01 PM    Primary Care Provider: Pcp Pt States None    CHIEF COMPLAINT  Chief Complaint   Patient presents with    Neck Swelling     R neck/submandibular swelling started earlier today     EXTERNAL RECORDS REVIEWED  None    HPI/ROS  LIMITATION TO HISTORY   Select: : None  OUTSIDE HISTORIAN(S):  Select: None noted    Ganga Castro is a 37 y.o. male who presents to the ED complaining of neck swelling onset today. He reports when he goes to swallow he feels tingling and pressure in his throat. He adds that he had nausea yesterday. He denies any fever or chills. He reports a history of ulcers. He states he has not had to take medication for his ulcers recently. He denies any dental pain. He reports drinking alcohol occasionally and smokes marijuana and tobacco today. He denies any history of cancer or diabetes. He denies any known allergies.     PAST MEDICAL HISTORY  Past Medical History:   Diagnosis Date    Gastric ulcer        SURGICAL HISTORY  None noted    FAMILY HISTORY  None noted    SOCIAL HISTORY   reports that he quit smoking about 3 years ago. His smoking use included cigarettes. He has never used smokeless tobacco. He reports current alcohol use. He reports current drug use. Drug: Inhaled.    CURRENT MEDICATIONS  None noted    ALLERGIES  Patient has no known allergies.    PHYSICAL EXAM  BP (!) 157/91   Pulse 68   Temp 37.1 °C (98.7 °F) (Temporal)   Resp 16   Ht 1.803 m (5' 11\")   Wt 87.6 kg (193 lb 2 oz)   SpO2 98%   BMI 26.94 kg/m²   Constitutional: Patient is well developed, well nourished. Non-toxic appearing. No acute distress.   HENT: Normocephalic, TM's visualized without erythema. Nose normal with no mucosal edema or drainage. Oropharynx moist without erythema or exudates.  Eyes: PERRL, EOMI, Conjunctiva without erythema or exudates.   Neck: Supple with Normal range of motion in flexion, extension and lateral " rotation. 6 cm x 4 cm area of soft tissue swelling under the right anterior submandibular region. Mildy tender with no erythema and no warmth  Cardiovascular: Normal heart rate and Regular rhythm. No murmur  Thorax & Lungs: Clear and equal breath sounds with good excursion. No respiratory distress, no rhonchi, wheezing or rales.   Abdomen: Bowel sounds normal in all four quadrants. Soft, non tender  Skin: Warm, Dry, No erythema, No rashes.   Back: No cervical, thoracic, or lumbosacral tenderness.  Extremities: Peripheral pulses 4/4 No edema, No tenderness  Musculoskeletal: Normal range of motion in all major joints.   Neurologic: Alert & oriented x 3, Normal motor function, Normal sensory function  Psychiatric: Affect , anxious     DIAGNOSTIC STUDIES    Labs:   Results for orders placed or performed during the hospital encounter of 01/10/23   CBC WITH DIFFERENTIAL   Result Value Ref Range    WBC 5.7 4.8 - 10.8 K/uL    RBC 4.83 4.70 - 6.10 M/uL    Hemoglobin 15.7 14.0 - 18.0 g/dL    Hematocrit 46.7 42.0 - 52.0 %    MCV 96.7 81.4 - 97.8 fL    MCH 32.5 27.0 - 33.0 pg    MCHC 33.6 (L) 33.7 - 35.3 g/dL    RDW 43.8 35.9 - 50.0 fL    Platelet Count 262 164 - 446 K/uL    MPV 9.2 9.0 - 12.9 fL    Neutrophils-Polys 46.00 44.00 - 72.00 %    Lymphocytes 45.00 (H) 22.00 - 41.00 %    Monocytes 6.90 0.00 - 13.40 %    Eosinophils 1.20 0.00 - 6.90 %    Basophils 0.70 0.00 - 1.80 %    Immature Granulocytes 0.20 0.00 - 0.90 %    Nucleated RBC 0.00 /100 WBC    Neutrophils (Absolute) 2.62 1.82 - 7.42 K/uL    Lymphs (Absolute) 2.56 1.00 - 4.80 K/uL    Monos (Absolute) 0.39 0.00 - 0.85 K/uL    Eos (Absolute) 0.07 0.00 - 0.51 K/uL    Baso (Absolute) 0.04 0.00 - 0.12 K/uL    Immature Granulocytes (abs) 0.01 0.00 - 0.11 K/uL    NRBC (Absolute) 0.00 K/uL   COMP METABOLIC PANEL   Result Value Ref Range    Sodium 140 135 - 145 mmol/L    Potassium 3.7 3.6 - 5.5 mmol/L    Chloride 104 96 - 112 mmol/L    Co2 27 20 - 33 mmol/L    Anion Gap 9.0 7.0  - 16.0    Glucose 92 65 - 99 mg/dL    Bun 14 8 - 22 mg/dL    Creatinine 0.97 0.50 - 1.40 mg/dL    Calcium 9.5 8.4 - 10.2 mg/dL    AST(SGOT) 19 12 - 45 U/L    ALT(SGPT) 17 2 - 50 U/L    Alkaline Phosphatase 95 30 - 99 U/L    Total Bilirubin 0.2 0.1 - 1.5 mg/dL    Albumin 4.6 3.2 - 4.9 g/dL    Total Protein 7.8 6.0 - 8.2 g/dL    Globulin 3.2 1.9 - 3.5 g/dL    A-G Ratio 1.4 g/dL   CORRECTED CALCIUM   Result Value Ref Range    Correct Calcium 9.0 8.5 - 10.5 mg/dL   ESTIMATED GFR   Result Value Ref Range    GFR (CKD-EPI) 103 >60 mL/min/1.73 m 2     Radiology:   The attending emergency physician has independently interpreted the diagnostic imaging associated with this visit and am waiting the final reading from the radiologist.   CT-SOFT TISSUE NECK WITH   Final Result      1.  Mildly edematous RIGHT submandibular gland, possibly inflamed.   2.  No cervical adenopathy or soft tissue abscess.         COURSE & MEDICAL DECISION MAKING     ED Observation Status? No; Patient does not meet criteria for ED Observation.     7:27 PM - Patient seen and examined at bedside. Discussed plan of care, including labs and imaging. Patient agrees to the plan of care. The patient will be medicated with Keflex 500 mg. Ordered for CT Soft Tissue Neck with, CBC with differential, CMP and Calcium to evaluate his symptoms. Differential diagnoses include but not limited to: abscess vs lymphoma.     7:29 PM - Patient was seen at bedside. I updated him on CT results and the plan for discharge home with antibiotics. He was advised of all return precautions. Patient verbalizes understanding and agreement to this plan of care.     INITIAL ASSESSMENT AND PLAN  Care Narrative: Patient received an IV, laboratories were drawn showing a normal white blood cell count.  CT soft tissue neck with contrast reveals mildly edematous right submandibular gland with no abscess or other abnormalities.  Patient was reassured that this is likely an infectious etiology  with his gland and he was treated with Keflex 500 mg orally here and a prescription for home.  He does have elevated lymphocytes of 45% on his differential.  He is to apply warm moist compresses increase fluids warm salt water gargles and follow-up with his primary care provider within the week for recheck.  He is discharged in stable and improved condition.    ADDITIONAL PROBLEM LIST AND DISPOSITION      The patient will return for new or worsening symptoms and is stable at the time of discharge.    The patient is referred to a primary physician for blood pressure management, diabetic screening, and for all other preventative health concerns.    DISPOSITION:  Patient will be discharged home in stable condition.    FOLLOW UP:  [address] [address]  [phone]  Schedule an appointment as soon as possible for a visit in 1 week      [place] [address] [address]  [facility] [address]  [phone]  Schedule an appointment as soon as possible for a visit in 1 week        OUTPATIENT MEDICATIONS:  Discharge Medication List as of 1/10/2023  7:33 PM        START taking these medications    Details   cephALEXin (KEFLEX) 500 MG Cap Take 1 Capsule by mouth 4 times a day., Disp-40 Capsule, R-0, Normal           FINAL DIANGOSIS  1. Submandibular gland swelling           The note accurately reflects work and decisions made by me.  Lisa Cheung D.O.  1/11/2023  12:16 AM